# Patient Record
Sex: MALE | Race: WHITE | URBAN - METROPOLITAN AREA
[De-identification: names, ages, dates, MRNs, and addresses within clinical notes are randomized per-mention and may not be internally consistent; named-entity substitution may affect disease eponyms.]

---

## 2024-11-11 ENCOUNTER — APPOINTMENT (OUTPATIENT)
Dept: PRIMARY CARE | Facility: CLINIC | Age: 24
End: 2024-11-11

## 2024-11-11 VITALS
SYSTOLIC BLOOD PRESSURE: 124 MMHG | WEIGHT: 263 LBS | HEART RATE: 96 BPM | HEIGHT: 71 IN | BODY MASS INDEX: 36.82 KG/M2 | DIASTOLIC BLOOD PRESSURE: 84 MMHG

## 2024-11-11 DIAGNOSIS — Z00.00 HEALTHCARE MAINTENANCE: Primary | ICD-10-CM

## 2024-11-11 DIAGNOSIS — M25.551 RIGHT HIP PAIN: ICD-10-CM

## 2024-11-11 DIAGNOSIS — Z76.89 ENCOUNTER TO ESTABLISH CARE WITH NEW DOCTOR: ICD-10-CM

## 2024-11-11 PROCEDURE — 3008F BODY MASS INDEX DOCD: CPT | Performed by: STUDENT IN AN ORGANIZED HEALTH CARE EDUCATION/TRAINING PROGRAM

## 2024-11-11 PROCEDURE — 99385 PREV VISIT NEW AGE 18-39: CPT | Performed by: STUDENT IN AN ORGANIZED HEALTH CARE EDUCATION/TRAINING PROGRAM

## 2024-11-11 ASSESSMENT — PATIENT HEALTH QUESTIONNAIRE - PHQ9
SUM OF ALL RESPONSES TO PHQ9 QUESTIONS 1 AND 2: 0
1. LITTLE INTEREST OR PLEASURE IN DOING THINGS: NOT AT ALL
2. FEELING DOWN, DEPRESSED OR HOPELESS: NOT AT ALL

## 2024-11-11 NOTE — PROGRESS NOTES
Subjective   Patient ID: Bright Alcala is a 24 y.o. male who presents for Annual Exam.  Today he is accompanied by alone.     HPI  New patient visit/health maintenance  Overall patient is doing well.   Denies any chest pain, shortness of breath or wheezing upon exertion.  Denies any fever chills or constitutional symptoms, denies any unintentional weight loss.  Denies any nausea/vomiting or constipation/diarrhea.  Denies any urinary symptoms.  Denies any recent change in hearing or vision.  Denies any lightheadedness, dizziness or balance problem   Immunization: Tdap 8/2020  Influenza vaccine declined  COVID-19 vaccine (Pfizer Moderna) declined:  Colon Cancer Screening: No family history, will resume screening at age 46  Diet: Trying to be more aware of his diet, increasing the amount of fruits and vegetable  Exercise: Admits to walking 2-3 times per week, but denies any dedicated exercise at the gym  Tobacco: Denies use  EtOH: Rarely Socially     Other problems/diagnoses addressed and reviewed during this encounter     2.  Right hip pain  Patient also complains of right hip pain of the duration of 2 months.  The pain is intermittent, happens when he gets up from a long period of sitting position, it is sharp in nature and last for few seconds, prior to resolve itself.  Denies any crepitation, or warm to touch of the area  Denies any trauma.  Admits of gaining weight in the last year and always uses a belt.  Patient works full-time as a .    Allergies: NKDA    Past medical history: None    Past surgical history: Cholecystectomy in November 2019    No current outpatient medications on file prior to visit.     No current facility-administered medications on file prior to visit.        Not on File    Immunization History   Administered Date(s) Administered    DTaP, Unspecified 2001, 01/04/2002, 03/05/2002    Hepatitis B vaccine, 19 yrs and under (RECOMBIVAX, ENGERIX) 01/04/2002    Hib (HbOC) 03/05/2002     "Hib / Hep B 2001    Meningococcal ACWY-D (Menactra) 4-valent conjugate vaccine 09/26/2017    Pneumococcal Conjugate PCV 7 2001, 01/04/2002, 03/05/2002    Poliovirus vaccine, subcutaneous (IPOL) 2001, 01/04/2002    Tdap vaccine, age 7 year and older (BOOSTRIX, ADACEL) 08/06/2020         Review of Systems  All pertinent positive symptoms are included in the history of present illness.  All other systems have been reviewed and are negative and noncontributory to this patient's current ailments.     Objective   /84   Pulse 96   Ht 1.803 m (5' 11\")   Wt 119 kg (263 lb)   BMI 36.68 kg/m²   BSA: 2.44 meters squared  No visits with results within 1 Month(s) from this visit.   Latest known visit with results is:   No results found for any previous visit.       Physical Exam  Constitutional:       General: He is not in acute distress.     Appearance: Normal appearance. He is normal weight. He is not ill-appearing.   HENT:      Head: Normocephalic and atraumatic.      Right Ear: Ear canal and external ear normal. There is no impacted cerumen.      Left Ear: Ear canal normal. There is no impacted cerumen.      Nose: No congestion or rhinorrhea.      Mouth/Throat:      Mouth: Mucous membranes are moist.      Pharynx: Oropharynx is clear. No oropharyngeal exudate or posterior oropharyngeal erythema.   Eyes:      General: No scleral icterus.     Extraocular Movements: Extraocular movements intact.      Conjunctiva/sclera: Conjunctivae normal.      Pupils: Pupils are equal, round, and reactive to light.   Cardiovascular:      Rate and Rhythm: Normal rate and regular rhythm.      Pulses: Normal pulses.      Heart sounds: Normal heart sounds. No murmur heard.  Pulmonary:      Effort: Pulmonary effort is normal. No respiratory distress.      Breath sounds: Normal breath sounds. No wheezing, rhonchi or rales.   Abdominal:      General: Abdomen is flat. Bowel sounds are normal.      Palpations: Abdomen is " soft.      Tenderness: There is no abdominal tenderness. There is no guarding or rebound.      Hernia: No hernia is present.   Musculoskeletal:         General: Tenderness present. Normal range of motion.      Right lower leg: No edema.      Left lower leg: No edema.      Comments: Mild tenderness on anterior part of the right hip when flexed at 90 degrees   Skin:     General: Skin is warm.      Capillary Refill: Capillary refill takes less than 2 seconds.      Findings: No lesion or rash.   Neurological:      General: No focal deficit present.      Mental Status: He is alert and oriented to person, place, and time.   Psychiatric:         Mood and Affect: Mood normal.         Behavior: Behavior normal.           Assessment/Plan   Diagnoses and all orders for this visit:  Healthcare maintenance  Encounter to establish care with new doctor  Complete history and physical examination was performed  Requisition for routine lab work was provided today, alongside with screening for HIV and hep C  We will notify of test results once available and make treatment recommendations accordingly  Educated about the importance of conducting healthy lifestyle, following well-balanced diet and exercising regularly  Patient was offered, but declined influenza vaccine    2.  Right hip pain  Clinical presentation and physical exam are more consistent with nerve impingement versus tendinitis of the hip area.  Femoral acetabular impingement can be considered as differential diagnosis but is less likely at this moment.  Patient instructed to try to lose weight, keep his belt a little bit more loose and use NSAID and ice packs as needed.  Also instructed to do some stretching exercise for the hip.  Offered x-rays, but patient declined at this moment.  Recommend return to the clinic for reevaluation if symptoms persist or get worse, considering imaging and physical therapy.    Thank you for letting us be a part of your care team.  Please  call the office if you have further questions or concerns regarding your care    Otherwise, please follow-up in 12 months for continued care and your yearly physical examination    Jacquelyn Salazar MD  PGY2, FM Resident